# Patient Record
Sex: MALE
[De-identification: names, ages, dates, MRNs, and addresses within clinical notes are randomized per-mention and may not be internally consistent; named-entity substitution may affect disease eponyms.]

---

## 2018-11-12 ENCOUNTER — HOSPITAL ENCOUNTER (INPATIENT)
Dept: HOSPITAL 42 - ED | Age: 28
LOS: 2 days | Discharge: HOME | DRG: 881 | End: 2018-11-14
Attending: PSYCHIATRY & NEUROLOGY | Admitting: PSYCHIATRY & NEUROLOGY
Payer: MEDICAID

## 2018-11-12 VITALS — BODY MASS INDEX: 25.8 KG/M2

## 2018-11-12 VITALS — OXYGEN SATURATION: 99 %

## 2018-11-12 DIAGNOSIS — R45.851: ICD-10-CM

## 2018-11-12 DIAGNOSIS — F32.9: Primary | ICD-10-CM

## 2018-11-12 DIAGNOSIS — G47.00: ICD-10-CM

## 2018-11-12 DIAGNOSIS — F14.20: ICD-10-CM

## 2018-11-12 DIAGNOSIS — F60.2: ICD-10-CM

## 2018-11-12 DIAGNOSIS — Z59.0: ICD-10-CM

## 2018-11-12 DIAGNOSIS — F12.20: ICD-10-CM

## 2018-11-12 PROCEDURE — GZ3ZZZZ MEDICATION MANAGEMENT: ICD-10-PCS | Performed by: PSYCHIATRY & NEUROLOGY

## 2018-11-12 RX ADMIN — MULTIPLE VITAMINS W/ MINERALS TAB SCH: TAB at 15:46

## 2018-11-12 SDOH — ECONOMIC STABILITY - HOUSING INSECURITY: HOMELESSNESS: Z59.0

## 2018-11-12 NOTE — ED PDOC
Arrival/HPI





- General


Chief Complaint: Psychiatric Evaluation


Time Seen by Provider: 11/12/18 06:58





- History of Present Illness


Narrative History of Present Illness (Text): 





11/12/18 07:01


29 y/o M w/ h/o substance(cocaine) abuse presenting to the Emergency Department 

as a transfer from [facility] for psychiatric stabilization. The 

patient presented on 11/11/18 with feelings of depression after a cocaine binge.

He was noted to have acute signs of cocaine intoxication and noted to possess 

thoughts of suicidal ideation. He was medical cleared signing voluntary consent 

for transfer to [facility] for psychiatric stabilization. The 

patient complains of no chest pain, shortness of breath, headaches, 

nausea/emesis, abdominal pain, back pain or any other somatic complaints at this

time. 








Time/Duration: Prior to Arrival


Symptom Onset: Sudden


Symptom Course: Unchanged


Activities at Onset: Rest


Context: Other (Transfer from outside hospital)





Past Medical History





- Provider Review


Nursing Documentation Reviewed: Yes





- Travel History


Have you recently traveled outside [place] w/in the past 3 mons?: No





- Psychiatric


Hx Substance Use: Yes (cocaine)





- Anesthesia


Hx Anesthesia: No





Family/Social History





- Physician Review


Nursing Documentation Reviewed: Yes


Family/Social History: No Known Family HX


Smoking Status: Never Smoked


Hx Alcohol Use: Yes


Frequency of alcohol use: Socially


Hx Substance Use: Yes (cocaine)





Allergies/Home Meds


Allergies/Adverse Reactions: 


Allergies





No Known Allergies Allergy (Verified 11/12/18 06:35)


   








Home Medications: 


                                    Home Meds











 Medication  Instructions  Recorded  Confirmed


 


No Known Home Med  11/12/18 11/12/18














Review of Systems





- Physician Review


All systems were reviewed & negative as marked: Yes





- Review of Systems


Constitutional: absent: Fevers


Eyes: absent: Vision Changes


Respiratory: absent: SOB


Cardiovascular: absent: Chest Pain


Gastrointestinal: absent: Abdominal Pain


Musculoskeletal: absent: Back Pain, Myalgias


Skin: absent: Rash


Neurological: absent: Headache, Dizziness


Psychiatric: absent: Anxiety





Physical Exam


Vital Signs Reviewed: Yes





Vital Signs











  Temp Pulse Resp BP Pulse Ox


 


 11/12/18 06:36  98.0 F  78  18  132/94 H  100











Temperature: Afebrile


Blood Pressure: Normal


Pulse: Regular


Respiratory Rate: Normal


Appearance: Positive for: Well-Appearing, Non-Toxic, Comfortable


Mental Status: Positive for: Alert and Oriented X 3





- Systems Exam


Head: Present: Atraumatic, Normocephalic


Pupils: Present: PERRL


Extroacular Muscles: Present: EOMI


Conjunctiva: Present: Normal


Mouth: Present: Moist Mucous Membranes


Neck: Present: Normal Range of Motion.  No: Paraspinal Tenderness


Respiratory/Chest: Present: Clear to Auscultation, Good Air Exchange.  No: 

Respiratory Distress, Accessory Muscle Use


Cardiovascular: Present: Regular Rate and Rhythm, Normal S1, S2.  No: Murmurs


Abdomen: Present: Normal Bowel Sounds.  No: Tenderness, Distention


Upper Extremity: Present: Normal Inspection.  No: Cyanosis, Edema


Lower Extremity: Present: Normal Inspection.  No: Edema


Neurological: Present: GCS=15, CN II-XII Intact, Speech Normal


Skin: Present: Warm, Dry, Normal Color.  No: Rashes


Psychiatric: Present: Alert, Oriented x 3, Normal Concentration





Medical Decision Making


ED Course and Treatment: 





11/12/18 07:11


Progress Notes


Review of forms show patient has no medical history with no acute somatic 

complaints at this time. He is stable for psychiatric transfer to the floor. 








Disposition/Present on Arrival





- Present on Arrival


Any Indicators Present on Arrival: No


History of DVT/PE: No


History of Uncontrolled Diabetes: No


Urinary Catheter: No


History of Decub. Ulcer: No


History Surgical Site Infection Following: None





- Disposition


Have Diagnosis and Disposition been Completed?: Yes


Diagnosis: 


 Depression, Suicidal ideation, Substance abuse





Disposition: HOSPITALIZED


Disposition Time: 07:00


Patient Plan: Admission


Condition: STABLE


Discharge Instructions (ExitCare):  Depression, Adult (DC)

## 2018-11-12 NOTE — PCM.BM
<José Galeano - Last Filed: 11/12/18 12:23>





Treatment Plan Problems





- Problems identified on initial assessmt


  ** Hopelessness/helplessness


Date Initiated: 11/12/18


Time Initiated: 09:00


Assessment reference: NA


Status: Active


Priority: 1


Comment: Feeling depressed





  ** Ineffective coping 


Date Initiated: 11/12/18


Time Initiated: 09:00


Assessment reference: NA


Status: Active


Priority: 2


Comment: Unable to cope with living situation and stresses





  ** Medication nonadhereance


Date Initiated: 11/12/18


Time Initiated: 09:00


Assessment reference: NA


Status: Active


Priority: 3


Comment: No taking medication





  ** Drug abuse


Date Initiated: 11/12/18


Time Initiated: 09:00


Assessment reference: NA


Status: Active


Priority: 4


Comment: Cocain and marijuana   





Treatment assets and liabiliti


Patient Assests: ADL independent, negotiates basic needs


Patient Liabilities: live alone, poor support system, substance abuse





- Milieu Protocol


Maintain good personal hygiene: daily Encourage regular showers, daily Assist 

patient to perform ADL's, every shift Remind patient to perform daily oral care


Conduct patient checks and document Observation sheet: Q15 minutes


Maintain personal safety: every shift Educate patient to report safety concerns 

to staff, every shift Monitor environment for contraband/sharps


Medication safety: Monitor for expected outcome, potential side effects: every 

shift, Assess barriers to learning: every shift, Assess readiness for medication

education: every shift





Discharge/Continuing Care





- Education Needs


Education Needs: Patient Medication, Patient Diagnosis/Disease Process, Patient 

Coping Skills, Patient Anger Management skills, Patient Placement options, 

Patient Community resources, Patient Activities of Daily Living, Patient 

Nutrition, Patient Health Practices/Safety, Patient Personal Hygiene/Grooming, 

Patient Aftercare Safety Plan





- Discharge


Discharge Criteria: Tolerates medication w/o severe side effects, Normal sleep 

pattern, Ability to care for self, Reduction of target symptoms





<Christina Seaman - Last Filed: 11/12/18 14:06>





- Diagnosis


(1) Depression


Status: Acute   


Interventions: 





11/12/18 14:03


Psychoeducation


Psychopharmacology/adjustment of medications as needed/ monitoring possible side

effects


Evaluate pt on daily basis


Compliance with medications and follow up appointments


Suicide and homicide risk assessment and prevention


Relapse prevention


Reduction of symptoms


Improve functional status


Family involvement


As outpatient: cognitive behavioral therapy








(2) Substance abuse


Status: Acute   


Interventions: 





11/12/18 14:05


Monitoring withdrawal symptoms


Medical detoxification


Pharmacotherapy for alcohol/benzos/opioid dependence 


Maintaining sobriety


Relapse prevention


Possible rehabilitation


Motivational interviewing


12-step programs: AA meetings

## 2018-11-12 NOTE — PCM.PSYCH
Initial Psychiatric Evaluation





- Initial Psychiatric Evaluation


Type of Admission: Voluntary


Legal Status: Capacity (Patient has capacity to sign consent for treatment)


Chief Complaint (in patient's own words): 





"I am here because I am feeling depressed, I don't want to talk now.."


Patient's Reaction to Hospitalization: 





pt was admitted for evaluation of depressive symptoms, possible suicidal 

ideation, inability to function. 








History of Present Illness and Precipitating Events: 


shortly pt is 28 year old  male With not known previous psychiatric 

history, patient was transferred from [facility] for evaluation 

of depressive symptoms, possible suicidal ideation, patient has history of 

cocaine and marijuana abuse and dependence, transfer was uneventful, patient 

requires further evaluation and stabilization and medications titration.





Patient was seen and examined, presented to be disheveled, patient refused to 

participate in treatment team meeting, patient presented to be irritable, 

annoyed, disengaged. Poor ADLs.





Majority of the information was taken from [facility] record.





As per record patient was brought to [facility] by  the [facility] after she was found walking the streets under the influence of drugs.

In the emergency room patient reported being "hopeless, not having a reason to 

leave". Patient also reported that he has suicidal ideation but denied any plan 

or intent to kill himself.





past psych h/o: unknown, unknown h/o suicidal attempts. 





medical h/o: pt was seen by medical team in [facility] and in [facility] 

ED, no acute medical issues. 





family h/o: unknown. 





social h/o: pt currently homeless, using marijuana and cocaine, unknown nicotine

abuse. 





labs WNL


UA showed blood, will call med consult


cocaine and THC positive


vitals are stable.


EKG NSR





                                        











Temp Pulse Resp BP Pulse Ox


 


 98 F   75   17   129/87   99 


 


 11/12/18 07:57  11/12/18 07:57  11/12/18 09:00  11/12/18 07:57  11/12/18 07:57











 














The patient failed the outpatient lower level of care: Yes


Current Medications: 





none








Present on Admission





- Present on Admission


Any Indicators Present on Admission: No





Review of Systems





- Review of Systems


Systems not reviewed;Unavailable: Acuity of Condition





- Constitutional


Constitutional: As Per HPI





- EENT


Eyes: As Per HPI


Ears: As Per HPI


Nose/Mouth/Throat: As Per HPI





- Cardiovascular


Cardiovascular: As Per HPI





- Respiratory


Respiratory: As Per HPI





- Gastrointestinal


Gastrointestinal: As Per HPI





- Genitourinary


Genitourinary: As Per HPI





- Reproductive: Male


Reproductive:Male: As Per HPI





- Musculoskeletal


Musculoskeletal: As Per HPI





- Integumentary


Integumentary: As Per HPI





- Neurological


Neurological: As Per HPI





- Psychiatric


Psychiatric: As Per HPI





- Endocrine


Endocrine: As Per HPI





- Hematologic/Lymphatic


Hematologic: As Per HPI





Past Patient History





- Past Psychiatric History


Prior Professional Help: unknonw


Prior Psychiatric Treatment: unknonw


At [facility] hospital: unknonw


Duration: unknonw


Nature of Treatment: unknonw


Explanation of prior treatment: 





unknonw





- PSYCHIATRIC


Hx Psychophysiologic Disorder: Yes (substance abuse, pt refused to participate 

in interview)


Hx Substance Use: Yes (cocaine)





- SURGICAL HISTORY


Hx Surgeries: No





- ANESTHESIA


Hx Anesthesia: No





- Medical/Surgical History


Reviewed & confirmed: by me





Meds


Allergies/Adverse Reactions: 


                                    Allergies











Allergy/AdvReac Type Severity Reaction Status Date / Time


 


No Known Allergies Allergy   Verified 11/12/18 06:35














Mental Status Examination





- Personal Presentation


Personal Presentation: Looks stated age





- Affect


Affect: Flat





- Motor Activity


Motor Activity: Psychomotor Retardation





- Reliability in Providing Information


Reliability in Providing Information: Other (refused to talk)





- Speech


Speech: Organized





- Mood


Mood: Depressed





- Cognitive Functions


Orientation: Person


Sensorium: Drowsy


Attention/Concentration: Easily distracted


Abstract Thinking: [place]


Estimate of Intelligence: Below average


Judgement: Intact, as evidence by: [facility] regarding need for hospitalization





- Risk


Risk: Suicidal, Withdrawal, Self-mutilation, Diminished functioning





- Strength & Assets Inventory


Strength & Assets Inventory: Other (pt is relatively healthy, no major medical 

issues)





- Limitations


Limitations: Other (poor social support, substance abuse, homelessness)





Psychiatric Physical Exam





- Physical Exam


Reviewed and confirmed: Emergency Department Physical Exam





Results





- Vital Signs


Recent Vital Signs: 





                                Last Vital Signs











Temp  98 F   11/12/18 07:57


 


Pulse  75   11/12/18 07:57


 


Resp  19   11/12/18 07:57


 


BP  129/87   11/12/18 07:57


 


Pulse Ox  99   11/12/18 07:57














- EKG Data


EKG Interpreted by: ER Physician


EKG shows normal: Sinus rhythm





DSM Plan





- DSM 5


DSM 5 Diagnosis: 





r/o MDD


r/o substance induced mood disorder


cocaine abuse/cannabis abuse








- Recommended/Plan of Treatment


Treatment Recommendations and Plan of Treatment: 


Milieu/structure/supportive therapy 


Medical consult will be called, UA showed blood


SW consultation for discharge plan and social issues 


Med management, will start PRN meds


Family involvement 


Follow up on labs 


Will monitor closely 


Pt was educated about risk/benefits and alternatives of medications, coping 

strategies (safety plan, suicide prevention), relapse prevention, importance of 

follow up with psychiatrist and therapist, stay away from drugs/alcohol/smoking





Projected ELOS: 7days


Prognosis: guarded


Discharge Plan and Discharge Criteria: 


Pt will be not depressed or manic, will be more hopeful, will be not psychotic 

or anxious, will be not having thoughts of harming self or others, will be 

tolerating medications well, will not have major side effects, will be able to 

function, will not pose threat to self or others.








- Tobacco Cessation


Tobacco Use Treatment Practical Counseling Provided: No


Reason for not providing: pt refused to participate


Tobacco Use Treatment FDA-Approved Cessation Medication Provided: No





- Alcohol or Substance Abuse


Does the patient have an Alcohol or Substance Abuse Disorder: Yes





Initial Psych Certification





- Initial Certification


I certify that the inpatient psychiatric facility admission was medically 

necessary for either: Treatment which could reasonbly be expected to improve 

pt's condition


I estimate of hospitalization is necessary for proper treatment of the patient: 

7


Unit of Time: Days


My plans for post-hospital care for this patient are: 





inpatient rehab

## 2018-11-13 VITALS — HEART RATE: 64 BPM | RESPIRATION RATE: 20 BRPM

## 2018-11-13 LAB
ALBUMIN SERPL-MCNC: 3.9 G/DL (ref 3–4.8)
ALBUMIN/GLOB SERPL: 1.1 {RATIO} (ref 1.1–1.8)
ALT SERPL-CCNC: 154 U/L (ref 7–56)
AST SERPL-CCNC: 101 U/L (ref 17–59)
BASOPHILS # BLD AUTO: 0.03 K/MM3 (ref 0–2)
BASOPHILS NFR BLD: 0.4 % (ref 0–3)
BUN SERPL-MCNC: 17 MG/DL (ref 7–21)
CALCIUM SERPL-MCNC: 9.3 MG/DL (ref 8.4–10.5)
EOSINOPHIL # BLD: 0.5 10*3/UL (ref 0–0.7)
EOSINOPHIL NFR BLD: 6.7 % (ref 1.5–5)
ERYTHROCYTE [DISTWIDTH] IN BLOOD BY AUTOMATED COUNT: 14.1 % (ref 11.5–14.5)
GFR NON-AFRICAN AMERICAN: > 60
GRANULOCYTES # BLD: 3.35 10*3/UL (ref 1.4–6.5)
GRANULOCYTES NFR BLD: 41.3 % (ref 50–68)
HDLC SERPL-MCNC: 42 MG/DL (ref 29–60)
HEPATITIS A IGM: NEGATIVE
HEPATITIS B CORE AB: NEGATIVE
HEPATITIS C ANTIBODY: REACTIVE
HGB BLD-MCNC: 15.9 G/DL (ref 14–18)
LDLC SERPL-MCNC: 75 MG/DL (ref 0–129)
LYMPHOCYTES # BLD: 3.6 10*3/UL (ref 1.2–3.4)
LYMPHOCYTES NFR BLD AUTO: 44.6 % (ref 22–35)
MCH RBC QN AUTO: 30.5 PG (ref 25–35)
MCHC RBC AUTO-ENTMCNC: 34.5 G/DL (ref 31–37)
MCV RBC AUTO: 88.3 FL (ref 80–105)
MONOCYTES # BLD AUTO: 0.6 10*3/UL (ref 0.1–0.6)
MONOCYTES NFR BLD: 7 % (ref 1–6)
PLATELET # BLD: 202 10^3/UL (ref 120–450)
PMV BLD AUTO: 10.4 FL (ref 7–11)
RBC # BLD AUTO: 5.22 10^6/UL (ref 3.5–6.1)
T4 FREE SERPL-MCNC: 1.01 NG/DL (ref 0.78–2.19)
WBC # BLD AUTO: 8.1 10^3/UL (ref 4.5–11)

## 2018-11-13 RX ADMIN — MULTIPLE VITAMINS W/ MINERALS TAB SCH TAB: TAB at 13:35

## 2018-11-13 NOTE — CP.PCM.CON
<LesYinka lincatherine - Last Filed: 11/13/18 15:10>





History of Present Illness





- History of Present Illness


History of Present Illness: 





Farhad Kay, PGY1 Consult Note for Hospitalist Team





Reason for consult: Elevated LFT's, hematuria


History reviewed and obtained from [facility] ([facility] medical records








This is a a 28 year old  single male with substance use and suicidal 

ideation who initially presented to [facility] ([facility] on 11/11/18 via EMS

after being found walking in the streets altered. He stated he had been thinking

about committing suicide for the last three weeks. He admits to not having a 

home for some time. He stated the people he was living with will no longer 

accept him and his family members will not talk to him. He denied having a plan 

for suicide and did not elaborate further on his thoughts beyond feeling 

hopeless and not having a reason to live. During examination today, patient 

refused to answer any questions. Our medical team attempted to communicate at 

7am, 8am as well as 10:45am and patient refused to answer any questions. 12 

point ROS and physical examination unobtainable due to patient refusing.





Per medical records from [facility] ([facility]), UDS positive for cocaine and 

THC, alcohol level < 10, EKG showed NSR at 76bpm, U/A positive for large blood 

and 10-19 RBC, SGPT/SGOT 108/93 and CXR showed no acute cardiopulmonary process.













Review of Systems





- Review of Systems


Systems not reviewed;Unavailable: Uncooperative





Past Patient History





- Past Social History


Smoking Status: Unknown If Ever Smoked





- CARDIAC


Hx Cardiac Disorders: No





- PULMONARY


Hx Respiratory Disorders: No





- NEUROLOGICAL


Hx Neurological Disorder: No





- HEENT


Hx HEENT Problems: No





- RENAL


Hx Chronic Kidney Disease: No





- ENDOCRINE/METABOLIC


Hx Endocrine Disorders: No





- HEMATOLOGICAL/ONCOLOGICAL


Hx Blood Disorders: No





- INTEGUMENTARY


Hx Dermatological Problems: No





- MUSCULOSKELETAL/RHEUMATOLOGICAL


Hx Musculoskeletal Disorders: No





- GASTROINTESTINAL


Hx Gastrointestinal Disorders: No





- GENITOURINARY/GYNECOLOGICAL


Hx Genitourinary Disorders: No





- PSYCHIATRIC


Hx Psychophysiologic Disorder: Yes (substance abuse, pt refused to participate 

in interview)


Hx Substance Use: Yes (cocaine)





- SURGICAL HISTORY


Hx Surgeries: No





- ANESTHESIA


Hx Anesthesia: No





Meds


Allergies/Adverse Reactions: 


                                    Allergies











Allergy/AdvReac Type Severity Reaction Status Date / Time


 


No Known Allergies Allergy   Verified 11/12/18 17:28














- Medications


Medications: 


                               Current Medications





Al Hydrox/Mg Hydrox/Simethicone (Maalox Plus 30 Ml)  30 ml PO DAILY PRN


   PRN Reason: Indigestion / Heartburn


Gabapentin (Neurontin)  300 mg PO TID CECILIA; Protocol


   Last Admin: 11/13/18 13:36 Dose:  300 mg


Lorazepam (Ativan)  2 mg PO Q6 PRN; Protocol


   PRN Reason: agitation/anxiety


   Last Admin: 11/13/18 13:35 Dose:  2 mg


Lorazepam (Ativan)  2 mg IM Q6H PRN


   PRN Reason: Agitation


Magnesium Hydroxide (Milk Of Magnesia)  30 ml PO DAILY PRN


   PRN Reason: Constipation


Mirtazapine (Remeron)  15 mg PO HS [facility]


Multivitamins/Minerals (Therapeutic-M Tab)  1 tab PO DAILY CECILIA


   Last Admin: 11/13/18 13:35 Dose:  1 tab


Ziprasidone (Geodon Inj)  20 mg IM Q6H PRN; Protocol


   PRN Reason: severe agitaiton/psychosis


Ziprasidone (Geodon Cap)  20 mg PO Q6H PRN; Protocol


   PRN Reason: psychosis/agitation


   Last Admin: 11/13/18 13:37 Dose:  20 mg











Physical Exam





- Constitutional


Additional comments: 





Refused physical examination 





Results





- Vital Signs


Recent Vital Signs: 


                                Last Vital Signs











Temp  97.9 F   11/13/18 07:24


 


Pulse  64   11/13/18 07:24


 


Resp  20   11/13/18 07:24


 


BP  120/69   11/13/18 07:24


 


Pulse Ox  99   11/12/18 07:57














- Labs


Result Diagrams: 


                                 11/13/18 06:51





                                 11/13/18 07:45


Labs: 


                         Laboratory Results - last 24 hr











  11/13/18 11/13/18 11/13/18





  06:51 07:11 07:45


 


WBC  8.1  


 


RBC  5.22  


 


Hgb  15.9  


 


Hct  46.1  


 


MCV  88.3  


 


MCH  30.5  


 


MCHC  34.5  


 


RDW  14.1  


 


Plt Count  202  


 


MPV  10.4  


 


Gran %  41.3 L  


 


Lymph % (Auto)  44.6 H  


 


Mono % (Auto)  7.0 H  


 


Eos % (Auto)  6.7 H  


 


Baso % (Auto)  0.4  


 


Gran #  3.35  


 


Lymph # (Auto)  3.6 H  


 


Mono # (Auto)  0.6  


 


Eos # (Auto)  0.5  


 


Baso # (Auto)  0.03  


 


Sodium   


 


Potassium   


 


Chloride   


 


Carbon Dioxide   


 


Anion Gap   


 


BUN   


 


Creatinine   


 


Est GFR ( Amer)   


 


Est GFR (Non-Af Amer)   


 


Random Glucose   


 


Calcium   


 


Phosphorus   


 


Magnesium   


 


Total Bilirubin   


 


AST   


 


ALT   


 


Alkaline Phosphatase   


 


Total Creatine Kinase   


 


Total Protein   


 


Albumin   


 


Globulin   


 


Albumin/Globulin Ratio   


 


Triglycerides   


 


Cholesterol   


 


LDL Cholesterol Direct   


 


HDL Cholesterol   


 


Free T4    1.01


 


TSH 3rd Generation    2.06


 


Alcohol, Quantitative   < 10 














  11/13/18 11/13/18





  07:45 10:50


 


WBC  


 


RBC  


 


Hgb  


 


Hct  


 


MCV  


 


MCH  


 


MCHC  


 


RDW  


 


Plt Count  


 


MPV  


 


Gran %  


 


Lymph % (Auto)  


 


Mono % (Auto)  


 


Eos % (Auto)  


 


Baso % (Auto)  


 


Gran #  


 


Lymph # (Auto)  


 


Mono # (Auto)  


 


Eos # (Auto)  


 


Baso # (Auto)  


 


Sodium  139 


 


Potassium  4.2 


 


Chloride  105 


 


Carbon Dioxide  26 


 


Anion Gap  12 


 


BUN  17 


 


Creatinine  0.8 


 


Est GFR ( Amer)  > 60 


 


Est GFR (Non-Af Amer)  > 60 


 


Random Glucose  95 


 


Calcium  9.3 


 


Phosphorus  2.9 


 


Magnesium  2.0 


 


Total Bilirubin  0.3 


 


AST  101 H 


 


ALT  154 H 


 


Alkaline Phosphatase  75 


 


Total Creatine Kinase   152


 


Total Protein  7.5 


 


Albumin  3.9 


 


Globulin  3.6 


 


Albumin/Globulin Ratio  1.1 


 


Triglycerides  74 


 


Cholesterol  128 L 


 


LDL Cholesterol Direct  75 


 


HDL Cholesterol  42 


 


Free T4  


 


TSH 3rd Generation  


 


Alcohol, Quantitative  














Assessment & Plan





- Assessment and Plan (Free Text)


Assessment: 





This is a 28 year old male with PMH of polysubstance abuse presenting to 

hospital for management of suicidal ideation. Medical service has been consulted

for elevated LFT's and hematuria. Patient transferred from [facility] for evaluation of depressive/suicidal ideations. 





Plan:





Transaminitis:


-AST/ALT is 101/154 


-CPK is 152


-Alcohol level <10


-discontinued tylenol 


-hepatitis panel pending


-urine drug screen pending


-regular diet for now


-will trend LFT's 





Hematuria:


-U/A pending


-urine culture pending


-consider urology consult following test results





Patient seen and discussed with attending, Dr. Vogt











<Darell Vogt - Last Filed: 11/13/18 15:55>





Meds





- Medications


Medications: 


                               Current Medications





Al Hydrox/Mg Hydrox/Simethicone (Maalox Plus 30 Ml)  30 ml PO DAILY PRN


   PRN Reason: Indigestion / Heartburn


Gabapentin (Neurontin)  300 mg PO TID CECILIA; Protocol


   Last Admin: 11/13/18 13:36 Dose:  300 mg


Lorazepam (Ativan)  2 mg PO Q6 PRN; Protocol


   PRN Reason: agitation/anxiety


   Last Admin: 11/13/18 13:35 Dose:  2 mg


Lorazepam (Ativan)  2 mg IM Q6H PRN


   PRN Reason: Agitation


Magnesium Hydroxide (Milk Of Magnesia)  30 ml PO DAILY PRN


   PRN Reason: Constipation


Mirtazapine (Remeron)  30 mg PO HS [facility]


Multivitamins/Minerals (Therapeutic-M Tab)  1 tab PO DAILY CECILIA


   Last Admin: 11/13/18 13:35 Dose:  1 tab


Oxcarbazepine (Trileptal)  150 mg PO BID CECILIA; Protocol


Quetiapine Fumarate (Seroquel)  50 mg PO HS [facility]ECILIA; Protocol


Ziprasidone (Geodon Inj)  20 mg IM Q6H PRN; Protocol


   PRN Reason: severe agitaiton/psychosis


Ziprasidone (Geodon Cap)  20 mg PO Q6H PRN; Protocol


   PRN Reason: psychosis/agitation


   Last Admin: 11/13/18 13:37 Dose:  20 mg











Results





- Vital Signs


Recent Vital Signs: 


                                Last Vital Signs











Temp  97.9 F   11/13/18 07:24


 


Pulse  64   11/13/18 07:24


 


Resp  20   11/13/18 07:24


 


BP  120/69   11/13/18 07:24


 


Pulse Ox  99   11/12/18 07:57














- Labs


Result Diagrams: 


                                 11/13/18 06:51





                                 11/13/18 07:45


Labs: 


                         Laboratory Results - last 24 hr











  11/13/18 11/13/18 11/13/18





  06:51 07:11 07:45


 


WBC  8.1  


 


RBC  5.22  


 


Hgb  15.9  


 


Hct  46.1  


 


MCV  88.3  


 


MCH  30.5  


 


MCHC  34.5  


 


RDW  14.1  


 


Plt Count  202  


 


MPV  10.4  


 


Gran %  41.3 L  


 


Lymph % (Auto)  44.6 H  


 


Mono % (Auto)  7.0 H  


 


Eos % (Auto)  6.7 H  


 


Baso % (Auto)  0.4  


 


Gran #  3.35  


 


Lymph # (Auto)  3.6 H  


 


Mono # (Auto)  0.6  


 


Eos # (Auto)  0.5  


 


Baso # (Auto)  0.03  


 


Sodium   


 


Potassium   


 


Chloride   


 


Carbon Dioxide   


 


Anion Gap   


 


BUN   


 


Creatinine   


 


Est GFR ( Amer)   


 


Est GFR (Non-Af Amer)   


 


Random Glucose   


 


Calcium   


 


Phosphorus   


 


Magnesium   


 


Total Bilirubin   


 


AST   


 


ALT   


 


Alkaline Phosphatase   


 


Total Creatine Kinase   


 


Total Protein   


 


Albumin   


 


Globulin   


 


Albumin/Globulin Ratio   


 


Triglycerides   


 


Cholesterol   


 


LDL Cholesterol Direct   


 


HDL Cholesterol   


 


Free T4    1.01


 


TSH 3rd Generation    2.06


 


Alcohol, Quantitative   < 10 














  11/13/18 11/13/18





  07:45 10:50


 


WBC  


 


RBC  


 


Hgb  


 


Hct  


 


MCV  


 


MCH  


 


MCHC  


 


RDW  


 


Plt Count  


 


MPV  


 


Gran %  


 


Lymph % (Auto)  


 


Mono % (Auto)  


 


Eos % (Auto)  


 


Baso % (Auto)  


 


Gran #  


 


Lymph # (Auto)  


 


Mono # (Auto)  


 


Eos # (Auto)  


 


Baso # (Auto)  


 


Sodium  139 


 


Potassium  4.2 


 


Chloride  105 


 


Carbon Dioxide  26 


 


Anion Gap  12 


 


BUN  17 


 


Creatinine  0.8 


 


Est GFR ( Amer)  > 60 


 


Est GFR (Non-Af Amer)  > 60 


 


Random Glucose  95 


 


Calcium  9.3 


 


Phosphorus  2.9 


 


Magnesium  2.0 


 


Total Bilirubin  0.3 


 


AST  101 H 


 


ALT  154 H 


 


Alkaline Phosphatase  75 


 


Total Creatine Kinase   152


 


Total Protein  7.5 


 


Albumin  3.9 


 


Globulin  3.6 


 


Albumin/Globulin Ratio  1.1 


 


Triglycerides  74 


 


Cholesterol  128 L 


 


LDL Cholesterol Direct  75 


 


HDL Cholesterol  42 


 


Free T4  


 


TSH 3rd Generation  


 


Alcohol, Quantitative  














Attending/Attestation





- Attestation


I have personally seen and examined this patient.: Yes


I have fully participated in the care of the patient.: Yes


I have reviewed all pertinent clinical information: Yes


Notes (Text): 


27 y/o M with PMH of polysubstance abuse, transferred here from [facility] for suicidal ideation and depression. Pt's lab work showed mildly

elevated AST and ALT with hematuria, hence the medical consult.


Pt is very combative and not co-operating with interview or exam. History was 

obtained from the chart. Unknown if pt had recent alcohol binge, pt does not 

respond to questions about abdominal pain, nausea or vomiting. Pt's non-

cooperation makes it difficult to put together a better clinical picture.


Will send for Hep panel


Avoid hepatotoxic medications


Send UDOA


Hold off on RUQ US for now


Will get repeat UA, urine cx. If hematuria persists, consider urology consult


Monitor H/H


Rest of care by primary team.


11/13/18 15:47

## 2018-11-13 NOTE — CARD
--------------- APPROVED REPORT --------------





Date of service: 11/13/2018



EKG Measurement

Heart [id number]

TX [id number]

[id number]

[id number]

[id number]



<Conclusion>

Normal sinus rhythm

Normal ECG

## 2018-11-13 NOTE — PCM.PYCHPN
Psychiatric Progress Note





- Psychiatric Progress Note


Patient seen today, length of contact: 30 minutes


Patient Chief Complaint: 





"nontender few business, why so many people are here?"


Problems Identified/Issues Discussed: 


Suicide/ homicide prevention, past psychiatric h/o, current psychiatric 

symptoms, medical problems, risk/benefits and alternatives of medications, medic

ations compliance, coping strategies, substance abuse h/o, relapse prevention, 

importance of follow up with psychiatrist and therapist, discharge plan. 





Medical Problems: 





patient reported being healthy


Diagnostic Results: 














                                 11/13/18 06:51 





                                 11/13/18 07:45 





                                   Lab Results





11/13/18 10:50: Total Creatine Kinase 152


11/13/18 07:45: Sodium 139, Potassium 4.2, Chloride 105, Carbon Dioxide 26, 

Anion Gap 12, BUN 17, Creatinine 0.8, Est GFR (African Amer) > 60, Est GFR (Non-

Af Amer) > 60, Random Glucose 95, Calcium 9.3, Phosphorus 2.9, Magnesium 2.0, 

Total Bilirubin 0.3,  H,  H, Alkaline Phosphatase 75, Total 

Protein 7.5, Albumin 3.9, Globulin 3.6, Albumin/Globulin Ratio 1.1, 

Triglycerides 74, Cholesterol 128 L, LDL Cholesterol Direct 75, HDL Cholesterol 

42


11/13/18 07:45: Free T4 1.01, TSH 3rd Generation 2.06


11/13/18 07:11: Alcohol, Quantitative < 10


11/13/18 06:51: WBC 8.1, RBC 5.22, Hgb 15.9, Hct 46.1, MCV 88.3, MCH 30.5, MCHC 

34.5, RDW 14.1, Plt Count 202, MPV 10.4, Gran % 41.3 L, Lymph % (Auto) 44.6 H, 

Mono % (Auto) 7.0 H, Eos % (Auto) 6.7 H, Baso % (Auto) 0.4, Gran # 3.35, Lymph #

(Auto) 3.6 H, Mono # (Auto) 0.6, Eos # (Auto) 0.5, Baso # (Auto) 0.03








Vital Signs











  Temp Pulse Resp BP Pulse Ox


 


 11/13/18 07:24  97.9 F  64  20  120/69 


 


 11/12/18 15:11   71   116/71 


 


 11/12/18 09:00    17  


 


 11/12/18 07:57  98 F  75  19  129/87  99


 


 11/12/18 07:35     130/83 


 


 11/12/18 07:26  98 F  67  19  132/94 H  97


 


 11/12/18 06:36  98.0 F  78  18  132/94 H  100











DSM 5 Symptoms Update: 


shortly pt is 28 year old  male With not known previous psychiatric 

history, patient was transferred from [facility] for evaluation 

of depressive symptoms, possible suicidal ideation, patient has history of 

cocaine and marijuana abuse and dependence, transfer was uneventful, patient 

requires further evaluation and stabilization and medications titration.





Patient was seen and examined today in his room, overnight patient was agitated,

needed to be medicated, patient presented to be annoyed, disengaged, 

disrespectful, strong antisocial personality traits.





Majority of the answers were "none of your business".





Overnight patient refused to stay with his roommate,, pulled the mattress and 

wanted to sleep in the hallway, security was called and patient was medicated.





As per staff patient is unpredictable, impulses are not controlled.





Patient said that he is hearing voices,, patient would make statements such as 

"me myself and I". 





h/o suicidal attempts, "more than five", pt "I don't remember when and what 

methods", by the end "I used [place] Roulette", when was asked if he has access 

to guns, pt said angry "none of your business", used profanity, needs constant 

redirection. 





pt said he was filling meds in [facility] in [place], but as per record from [facility] pt was only

on abx once, no psychotrocpc meds





pt said he is more than 10 psychiatric admissions, patient reported that he was 

doing well on Trileptal, Remeron, Neurontin. Willing to take medications.





Impression:


Rule out schizoaffective


Rule out substance-induced mood disorder


Rule out antisocial personality disorder


Rule out bipolar spectrum disorder


Medication Change: Yes (Remeron at the nighttime, needs constant resumed, 

Trileptal resumed)


Medical Record Reviewed: Yes


Consults ordered or reviewed: 





Patient is healthy, has good appetite, no major medical issues





Mental Status Examination





- Cognitive Function


Orientation: Person


Memory: Intact


Attention: Poor


Concentration: Poor


Association: Loose


Fund of Knowledge: Poor





- Mood


Mood: Depressed





- Affect


Affect: Flat





- Formal Thought Process


Formal Thought Process: Paranoia (patient was guarded)





- Suicidal Ideation


Suicidal Ideation: No





- Homicidal Ideation


Homicidal Ideation: No





Goal/Treatment Plan





- Goal/Treatment Plan


Need for Continued Stay: Remain at risks for inpatient hospitalization, Severe 

depression anxiety, Discharge may exacerbated symptoms, Severe functional 

impairment


Progress Toward Problem(s) and Goals/Treatment Plan: 


Milieu/structure/supportive therapy 


Medical consult will be called, UA showed blood


SW consultation for discharge plan and social issues 


Remeron 15mg hs for depression and insomnia


neurontin 300mg po tid for mood stabilization


trileptal 150mg po bid for mood stabilization


seroquel will be started


will start PRN meds


Family involvement 


Follow up on labs 


Will monitor closely 


Pt was educated about risk/benefits and alternatives of medications, coping 

strategies (safety plan, suicide prevention), relapse prevention, importance of 

follow up with psychiatrist and therapist, stay away from drugs/alcohol/smoking





Estimated Date of D/C: 11/20/18

## 2018-11-14 VITALS — TEMPERATURE: 98.1 F | DIASTOLIC BLOOD PRESSURE: 73 MMHG | SYSTOLIC BLOOD PRESSURE: 112 MMHG

## 2018-11-14 LAB
ALBUMIN SERPL-MCNC: 4 G/DL (ref 3–4.8)
ALBUMIN/GLOB SERPL: 1.1 {RATIO} (ref 1.1–1.8)
ALT SERPL-CCNC: 150 U/L (ref 7–56)
AST SERPL-CCNC: 92 U/L (ref 17–59)
BASOPHILS # BLD AUTO: 0.04 K/MM3 (ref 0–2)
BASOPHILS NFR BLD: 0.4 % (ref 0–3)
BUN SERPL-MCNC: 19 MG/DL (ref 7–21)
CALCIUM SERPL-MCNC: 9.4 MG/DL (ref 8.4–10.5)
EOSINOPHIL # BLD: 0.5 10*3/UL (ref 0–0.7)
EOSINOPHIL NFR BLD: 5.5 % (ref 1.5–5)
ERYTHROCYTE [DISTWIDTH] IN BLOOD BY AUTOMATED COUNT: 13.9 % (ref 11.5–14.5)
GFR NON-AFRICAN AMERICAN: > 60
GRANULOCYTES # BLD: 3.79 10*3/UL (ref 1.4–6.5)
GRANULOCYTES NFR BLD: 40.5 % (ref 50–68)
HGB BLD-MCNC: 16 G/DL (ref 14–18)
LYMPHOCYTES # BLD: 4.4 10*3/UL (ref 1.2–3.4)
LYMPHOCYTES NFR BLD AUTO: 46.7 % (ref 22–35)
MCH RBC QN AUTO: 30.6 PG (ref 25–35)
MCHC RBC AUTO-ENTMCNC: 34.5 G/DL (ref 31–37)
MCV RBC AUTO: 88.7 FL (ref 80–105)
MONOCYTES # BLD AUTO: 0.6 10*3/UL (ref 0.1–0.6)
MONOCYTES NFR BLD: 6.9 % (ref 1–6)
PLATELET # BLD: 194 10^3/UL (ref 120–450)
PMV BLD AUTO: 10 FL (ref 7–11)
RBC # BLD AUTO: 5.23 10^6/UL (ref 3.5–6.1)
WBC # BLD AUTO: 9.3 10^3/UL (ref 4.5–11)

## 2018-11-14 NOTE — CP.PCM.PN
Subjective





- Date & Time of Evaluation


Date of Evaluation: 11/14/18


Time of Evaluation: 08:00





- Subjective


Subjective: 





Farhad Kay, PGY1 Medicine Progress Note





Patient seen but continues to be selectively mute. ROS and exam unobtainable due

to patient refusal. 





Objective





- Vital Signs/Intake and Output


Vital Signs (last 24 hours): 


                                        











Temp Pulse Resp BP Pulse Ox


 


 98.1 F   64   20   112/73   99 


 


 11/14/18 07:23  11/14/18 07:23  11/14/18 07:23  11/14/18 07:23  11/12/18 07:57











- Labs


Labs: 


                                        





                                 11/14/18 08:00 





                                 11/14/18 08:00 











- Additional Findings


Additional findings: 





Exam unobtainable due to patient refusal.





Assessment and Plan





- Assessment and Plan (Free Text)


Assessment: 





This is a 28 year old male with PMH of polysubstance abuse presenting to 

hospital for management of suicidal ideation. Medical service has been consulted

for elevated LFT's and hematuria. Patient transferred from [facility] for evaluation of depressive/suicidal ideations. 





Plan:





Transaminitis:


-AST/ALT is 92/150 from 101/154. Mildly improving, consider likely 2/2 

dehydration 


-CPK is 152


-Alcohol level <10


-discontinued tylenol 


-hepatitis panel unremarkable


-avoid nephrotoxic agents





Hematuria:


-unable to follow up, consider follow up urinalysis





Patient has vital signs and has downtrending LFT's. Recommend follow up LFT 

blood work in two weeks. 








Patient seen and discussed with attending, Dr. Parsons

## 2018-11-14 NOTE — CP.PCM.PCO
Physician Communication Note





- Physician Communication Note


Physician Communication Note: Pt is pscyhiatrically stable for discharge
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
